# Patient Record
Sex: MALE | Race: WHITE | NOT HISPANIC OR LATINO | Employment: OTHER | URBAN - NONMETROPOLITAN AREA
[De-identification: names, ages, dates, MRNs, and addresses within clinical notes are randomized per-mention and may not be internally consistent; named-entity substitution may affect disease eponyms.]

---

## 2017-07-26 ENCOUNTER — HOSPITAL ENCOUNTER (EMERGENCY)
Facility: HOSPITAL | Age: 37
Discharge: HOME | End: 2017-07-26
Attending: EMERGENCY MEDICINE | Admitting: EMERGENCY MEDICINE

## 2017-07-26 VITALS
TEMPERATURE: 97.8 F | BODY MASS INDEX: 32.58 KG/M2 | DIASTOLIC BLOOD PRESSURE: 95 MMHG | OXYGEN SATURATION: 98 % | RESPIRATION RATE: 18 BRPM | HEIGHT: 69 IN | SYSTOLIC BLOOD PRESSURE: 145 MMHG | HEART RATE: 78 BPM | WEIGHT: 220 LBS

## 2017-07-26 DIAGNOSIS — B86 SCABIES: ICD-10-CM
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
## 2017-07-26 PROCEDURE — 99282 EMERGENCY DEPT VISIT SF MDM: CPT

## 2017-07-26 RX ORDER — PERMETHRIN 50 MG/G
CREAM TOPICAL ONCE
Qty: 1 EACH | Refills: 0 | Status: SHIPPED | OUTPATIENT
Start: 2017-07-26 | End: 2017-07-26

## 2017-07-27 NOTE — ED PROVIDER NOTES
Subjective   Patient is a 37 y.o. male presenting with rash.   History provided by:  Patient   used: No    Rash   Location:  Shoulder/arm  Shoulder/arm rash location:  R arm and L arm  Quality: painful    Pain details:     Quality:  Sore    Duration:  1 week    Timing:  Constant    Progression:  Worsening  Severity:  Mild  Duration:  1 week  Timing:  Constant  Context: insect bite/sting    Context: not animal contact, not chemical exposure, not eggs, not exposure to similar rash, not food, not hot tub use, not medications, not new detergent/soap and not sick contacts    Relieved by:  Nothing  Worsened by:  Nothing  Ineffective treatments:  None tried  Associated symptoms: no abdominal pain, no diarrhea, no fatigue, no fever, no myalgias, no nausea, no shortness of breath, no sore throat, no URI, not vomiting and not wheezing        Review of Systems   Constitutional: Negative for chills, fatigue and fever.   HENT: Negative for congestion, rhinorrhea, sore throat and trouble swallowing.    Eyes: Negative for discharge and visual disturbance.   Respiratory: Negative for cough, chest tightness, shortness of breath and wheezing.    Cardiovascular: Negative for chest pain, palpitations and leg swelling.   Gastrointestinal: Negative for abdominal pain, constipation, diarrhea, nausea and vomiting.   Genitourinary: Negative for dysuria, flank pain and hematuria.   Musculoskeletal: Negative for back pain, myalgias and neck pain.   Skin: Positive for rash. Negative for color change.   Neurological: Negative for dizziness, weakness and numbness.   Psychiatric/Behavioral: Negative for self-injury and suicidal ideas.       Past Medical History:   Diagnosis Date   • Allergic rhinitis    • Arthritis    • Back pain    • Low back pain        No Known Allergies    Past Surgical History:   Procedure Laterality Date   • CHOLECYSTECTOMY  2014   • CHOLECYSTECTOMY     • TONSILLECTOMY         Family History   Problem  Relation Age of Onset   • Diabetes Mother        Social History     Social History   • Marital status:      Spouse name: N/A   • Number of children: N/A   • Years of education: N/A     Social History Main Topics   • Smoking status: Current Every Day Smoker     Packs/day: 1.00   • Smokeless tobacco: Never Used   • Alcohol use No   • Drug use: No   • Sexual activity: Defer     Other Topics Concern   • Not on file     Social History Narrative           Objective   Physical Exam   Constitutional: He is oriented to person, place, and time. He appears well-developed and well-nourished.   HENT:   Head: Normocephalic and atraumatic.   Nose: Nose normal.   Mouth/Throat: Oropharynx is clear and moist.   Eyes: Conjunctivae and EOM are normal. Pupils are equal, round, and reactive to light.   Neck: Normal range of motion. Neck supple.   Cardiovascular: Normal rate, regular rhythm, normal heart sounds and intact distal pulses.    Pulmonary/Chest: Effort normal and breath sounds normal. No respiratory distress. He has no wheezes. He exhibits no tenderness.   Abdominal: Soft. Bowel sounds are normal. There is no tenderness. There is no rebound and no guarding.   Musculoskeletal: Normal range of motion. He exhibits no edema, tenderness or deformity.   Neurological: He is alert and oriented to person, place, and time. No cranial nerve deficit. Coordination normal.   Skin: Skin is warm and dry. Rash noted. No erythema. No pallor.   Bug bites to forearms. Bite marks between fingers. Possible scabies.    Psychiatric: He has a normal mood and affect. His behavior is normal. Judgment and thought content normal.   Nursing note and vitals reviewed.      Procedures         ED Course  ED Course                  MDM  Number of Diagnoses or Management Options  Bug bite, initial encounter:   Scabies:   Diagnosis management comments: 1 week of bug bite marks to forearms and hands. Patient unsure what is causing the bites. Painful with  intermittent puritis. No other contacts have the bites. Patient denies new clothing, bedding, detergent, soap. Has not stayed in a hotel or in someone else's house. Exam shows bug bites to forearms and hands. Marks between fingers. Possible scabies vs bed bugs. Will give rx for permethrin and mupiricin. Patient told to take medication as directed, to follow up with PCP this week, and to return for worsening symptoms. Patient is agreeable with the plan of care.       Final diagnoses:   Bug bite, initial encounter            Miranda Rios MD  07/26/17 8537

## 2018-01-15 ENCOUNTER — OFFICE VISIT (OUTPATIENT)
Dept: RETAIL CLINIC | Facility: CLINIC | Age: 38
End: 2018-01-15

## 2018-01-15 VITALS
OXYGEN SATURATION: 98 % | TEMPERATURE: 98.1 F | RESPIRATION RATE: 16 BRPM | WEIGHT: 222 LBS | HEART RATE: 98 BPM | BODY MASS INDEX: 32.88 KG/M2 | HEIGHT: 69 IN

## 2018-01-15 DIAGNOSIS — H66.90 EAR INFECTION: Primary | ICD-10-CM

## 2018-01-15 DIAGNOSIS — F17.200 CURRENT SMOKER: ICD-10-CM

## 2018-01-15 PROCEDURE — 99213 OFFICE O/P EST LOW 20 MIN: CPT | Performed by: NURSE PRACTITIONER

## 2018-01-15 RX ORDER — OFLOXACIN 3 MG/ML
10 SOLUTION AURICULAR (OTIC) DAILY
Qty: 5 ML | Refills: 0 | Status: SHIPPED | OUTPATIENT
Start: 2018-01-15 | End: 2018-01-22

## 2018-01-15 RX ORDER — AMOXICILLIN AND CLAVULANATE POTASSIUM 875; 125 MG/1; MG/1
1 TABLET, FILM COATED ORAL
Qty: 20 TABLET | Refills: 0 | Status: SHIPPED | OUTPATIENT
Start: 2018-01-15 | End: 2018-01-25

## 2018-01-15 NOTE — PATIENT INSTRUCTIONS
Steps to Quit Smoking  Smoking tobacco can be bad for your health. It can also affect almost every organ in your body. Smoking puts you and people around you at risk for many serious long-lasting (chronic) diseases. Quitting smoking is hard, but it is one of the best things that you can do for your health. It is never too late to quit.  What are the benefits of quitting smoking?  When you quit smoking, you lower your risk for getting serious diseases and conditions. They can include:  · Lung cancer or lung disease.  · Heart disease.  · Stroke.  · Heart attack.  · Not being able to have children (infertility).  · Weak bones (osteoporosis) and broken bones (fractures).  If you have coughing, wheezing, and shortness of breath, those symptoms may get better when you quit. You may also get sick less often. If you are pregnant, quitting smoking can help to lower your chances of having a baby of low birth weight.  What can I do to help me quit smoking?  Talk with your doctor about what can help you quit smoking. Some things you can do (strategies) include:  · Quitting smoking totally, instead of slowly cutting back how much you smoke over a period of time.  · Going to in-person counseling. You are more likely to quit if you go to many counseling sessions.  · Using resources and support systems, such as:  ¨ Online chats with a counselor.  ¨ Phone quitlines.  ¨ Printed self-help materials.  ¨ Support groups or group counseling.  ¨ Text messaging programs.  ¨ Mobile phone apps or applications.  · Taking medicines. Some of these medicines may have nicotine in them. If you are pregnant or breastfeeding, do not take any medicines to quit smoking unless your doctor says it is okay. Talk with your doctor about counseling or other things that can help you.  Talk with your doctor about using more than one strategy at the same time, such as taking medicines while you are also going to in-person counseling. This can help make quitting  easier.  What things can I do to make it easier to quit?  Quitting smoking might feel very hard at first, but there is a lot that you can do to make it easier. Take these steps:  · Talk to your family and friends. Ask them to support and encourage you.  · Call phone quitlines, reach out to support groups, or work with a counselor.  · Ask people who smoke to not smoke around you.  · Avoid places that make you want (trigger) to smoke, such as:  ¨ Bars.  ¨ Parties.  ¨ Smoke-break areas at work.  · Spend time with people who do not smoke.  · Lower the stress in your life. Stress can make you want to smoke. Try these things to help your stress:  ¨ Getting regular exercise.  ¨ Deep-breathing exercises.  ¨ Yoga.  ¨ Meditating.  ¨ Doing a body scan. To do this, close your eyes, focus on one area of your body at a time from head to toe, and notice which parts of your body are tense. Try to relax the muscles in those areas.  · Download or buy apps on your mobile phone or tablet that can help you stick to your quit plan. There are many free apps, such as QuitGuide from the CDC (Centers for Disease Control and Prevention). You can find more support from smokefree.gov and other websites.  This information is not intended to replace advice given to you by your health care provider. Make sure you discuss any questions you have with your health care provider.  Document Released: 10/14/2010 Document Revised: 08/15/2017 Document Reviewed: 05/03/2016  [employer] Interactive Patient Education © 2017 [employer]    Otitis Media, Adult  Otitis media is redness, soreness, and inflammation of the middle ear. Otitis media may be caused by allergies or, most commonly, by infection. Often it occurs as a complication of the common cold.  What are the signs or symptoms?  Symptoms of otitis media may include:  · Earache.  · Fever.  · Ringing in your ear.  · Headache.  · Leakage of fluid from the ear.  How is this diagnosed?  To diagnose otitis  media, your health care provider will examine your ear with an otoscope. This is an instrument that allows your health care provider to see into your ear in order to examine your eardrum. Your health care provider also will ask you questions about your symptoms.  How is this treated?  Typically, otitis media resolves on its own within 3-5 days. Your health care provider may prescribe medicine to ease your symptoms of pain. If otitis media does not resolve within 5 days or is recurrent, your health care provider may prescribe antibiotic medicines if he or she suspects that a bacterial infection is the cause.  Follow these instructions at home:  · If you were prescribed an antibiotic medicine, finish it all even if you start to feel better.  · Take medicines only as directed by your health care provider.  · Keep all follow-up visits as directed by your health care provider.  Contact a health care provider if:  · You have otitis media only in one ear, or bleeding from your nose, or both.  · You notice a lump on your neck.  · You are not getting better in 3-5 days.  · You feel worse instead of better.  Get help right away if:  · You have pain that is not controlled with medicine.  · You have swelling, redness, or pain around your ear or stiffness in your neck.  · You notice that part of your face is paralyzed.  · You notice that the bone behind your ear (mastoid) is tender when you touch it.  This information is not intended to replace advice given to you by your health care provider. Make sure you discuss any questions you have with your health care provider.  Document Released: 09/22/2005 Document Revised: 05/25/2017 Document Reviewed: 07/15/2014  [employer] Interactive Patient Education © 2017 [employer]

## 2018-01-15 NOTE — PROGRESS NOTES
"  HPI Comments: Ajay presents to the clinic today c/o upper respiratory symptoms which started appx one week ago. Associated symptoms include nasal congestion/drainage, nonproductive cough and ear pain. He has tried continuing his routine allergy medications without relief. His symptoms are worsening. Refer to ROS for additional information.    URI    This is a new problem. The current episode started in the past 7 days. The problem has been gradually worsening. Maximum temperature: Unmeasured. Associated symptoms include congestion, coughing, ear pain, headaches, a plugged ear sensation and rhinorrhea. Pertinent negatives include no nausea, rash, sneezing, sore throat, swollen glands, vomiting or wheezing. Treatments tried: Routine allergy medication. The treatment provided no relief.      Vitals:    01/15/18 1238   Pulse: 98   Resp: 16   Temp: 98.1 °F (36.7 °C)   TempSrc: Oral   SpO2: 98%   Weight: 101 kg (222 lb)   Height: 175.3 cm (69\")      The following portions of the patient's history were reviewed and updated as appropriate: allergies, current medications, past family history, past medical history, past social history, past surgical history and problem list.    Review of Systems   Constitutional: Negative for appetite change, chills and fatigue.   HENT: Positive for congestion, ear pain, hearing loss, postnasal drip, rhinorrhea and sinus pressure. Negative for ear discharge, sneezing, sore throat and trouble swallowing.    Eyes: Negative for discharge and redness.   Respiratory: Positive for cough. Negative for chest tightness, shortness of breath and wheezing.    Gastrointestinal: Negative for nausea and vomiting.   Skin: Negative for color change and rash.   Neurological: Positive for headaches. Negative for dizziness and light-headedness.   Hematological: Negative for adenopathy.   Psychiatric/Behavioral: Positive for sleep disturbance.   All other systems reviewed and are negative.    Physical Exam "   Constitutional: He is oriented to person, place, and time. He appears well-developed and well-nourished. No distress.   HENT:   Head: Normocephalic.   Right Ear: Tympanic membrane and ear canal normal. No swelling. No mastoid tenderness. Tympanic membrane is not erythematous and not bulging. No decreased hearing is noted.   Left Ear: There is swelling. No drainage. There is mastoid tenderness. Tympanic membrane is erythematous and bulging. Decreased hearing is noted.   Nose: Mucosal edema and rhinorrhea present. Right sinus exhibits no maxillary sinus tenderness and no frontal sinus tenderness. Left sinus exhibits no maxillary sinus tenderness and no frontal sinus tenderness.   Mouth/Throat: Mucous membranes are normal. Oropharyngeal exudate (PND) and posterior oropharyngeal erythema present.   Eyes: Conjunctivae are normal. Pupils are equal, round, and reactive to light. Right eye exhibits no discharge. Left eye exhibits no discharge. No scleral icterus.   Neck: Normal range of motion. Neck supple. No tracheal tenderness present.   Cardiovascular: Normal rate, regular rhythm and normal heart sounds.  Exam reveals no friction rub.    No murmur heard.  Pulmonary/Chest: Effort normal and breath sounds normal. No respiratory distress. He has no wheezes. He has no rales.   Musculoskeletal: He exhibits no edema.   Lymphadenopathy:        Head (right side): No preauricular and no posterior auricular adenopathy present.        Head (left side): No preauricular and no posterior auricular adenopathy present.     He has no cervical adenopathy.   Neurological: He is alert and oriented to person, place, and time.   Skin: Skin is warm and dry. No rash noted. No erythema.   Psychiatric: He has a normal mood and affect. His behavior is normal. Judgment and thought content normal.   Vitals reviewed.    Assessment/Plan   Problems Addressed this Visit        Other    Current smoker      Other Visit Diagnoses     Ear infection    -   Primary    Findings and recommendations discussed with Ajay. Treatment options reviewed. Counseled regarding supportive care measures.    Relevant Medications    amoxicillin-clavulanate (AUGMENTIN) 875-125 MG per tablet    ofloxacin (FLOXIN OTIC) 0.3 % otic solution        Findings and recommendations discussed with Ajay. Treatment options reviewed. Counseled regarding supportive care measures.Encouraged regarding smoking cessation and provided information on strategies to quit. Encouraged Ajay to seek further medical evaluation if symptoms worsen or do not improve within 48-72 hours.

## 2021-06-28 ENCOUNTER — OFFICE VISIT (OUTPATIENT)
Dept: PSYCHIATRY | Facility: CLINIC | Age: 41
End: 2021-06-28

## 2021-06-28 VITALS — BODY MASS INDEX: 33.03 KG/M2 | HEIGHT: 69 IN | WEIGHT: 223 LBS

## 2021-06-28 DIAGNOSIS — G47.9 SLEEP DIFFICULTIES: ICD-10-CM

## 2021-06-28 DIAGNOSIS — F41.1 GENERALIZED ANXIETY DISORDER WITH PANIC ATTACKS: Primary | ICD-10-CM

## 2021-06-28 DIAGNOSIS — R46.89 AGGRESSIVE BEHAVIOR: ICD-10-CM

## 2021-06-28 DIAGNOSIS — F39 MOOD DISORDER (HCC): ICD-10-CM

## 2021-06-28 DIAGNOSIS — F17.200 CURRENT SMOKER: ICD-10-CM

## 2021-06-28 DIAGNOSIS — G89.29 OTHER CHRONIC PAIN: ICD-10-CM

## 2021-06-28 DIAGNOSIS — F41.0 GENERALIZED ANXIETY DISORDER WITH PANIC ATTACKS: Primary | ICD-10-CM

## 2021-06-28 DIAGNOSIS — Z69.011: ICD-10-CM

## 2021-06-28 PROBLEM — Z88.9 HISTORY OF MULTIPLE ALLERGIES: Status: ACTIVE | Noted: 2017-05-19

## 2021-06-28 PROBLEM — J30.9 ALLERGIC RHINITIS: Status: ACTIVE | Noted: 2017-06-01

## 2021-06-28 PROBLEM — I10 HYPERTENSIVE DISORDER: Status: ACTIVE | Noted: 2017-05-19

## 2021-06-28 PROCEDURE — 90837 PSYTX W PT 60 MINUTES: CPT | Performed by: COUNSELOR

## 2021-06-28 PROCEDURE — 99354 PR PROLONGED SVC OUTPATIENT SETTING 1ST HOUR: CPT | Performed by: COUNSELOR

## 2021-06-28 RX ORDER — MONTELUKAST SODIUM 10 MG/1
1 TABLET ORAL
COMMUNITY
Start: 2019-01-18 | End: 2021-11-08 | Stop reason: SDUPTHER

## 2021-06-28 RX ORDER — METHOCARBAMOL 750 MG/1
1 TABLET, FILM COATED ORAL EVERY 12 HOURS
COMMUNITY
Start: 2019-01-18

## 2021-06-28 RX ORDER — HYDROCODONE BITARTRATE AND ACETAMINOPHEN 10; 325 MG/1; MG/1
TABLET ORAL
COMMUNITY
Start: 2021-06-25 | End: 2021-11-08 | Stop reason: SDUPTHER

## 2021-06-28 RX ORDER — NAPROXEN 500 MG/1
TABLET ORAL
COMMUNITY
Start: 2021-06-07 | End: 2021-11-08 | Stop reason: SDUPTHER

## 2021-06-28 RX ORDER — GABAPENTIN 800 MG/1
1 TABLET ORAL EVERY 8 HOURS
COMMUNITY
Start: 2019-02-15

## 2021-06-28 RX ORDER — CETIRIZINE HYDROCHLORIDE 10 MG/1
1 TABLET ORAL
COMMUNITY
Start: 2019-01-18 | End: 2021-11-08 | Stop reason: SDUPTHER

## 2021-06-28 RX ORDER — CETIRIZINE HYDROCHLORIDE 10 MG/1
10 TABLET ORAL DAILY
COMMUNITY
End: 2021-06-28 | Stop reason: SDUPTHER

## 2021-06-28 NOTE — TREATMENT PLAN
Multi-Disciplinary Problems (from Behavioral Health Treatment Plan)    Active Problems     Problem: Anger  Start Date: 06/28/21    Problem Details: The patient self-scales this problem as a 8 with 10 being the worst.        Goal Priority Start Date Expected End Date End Date    Patient will develop specific, socially acceptable way to manage anger. -- 06/28/21 -- --    Goal Details: Progress toward goal:  Not appropriate to rate progress toward goal since this is the initial treatment plan.        Goal Intervention Frequency Start Date End Date    Process patient's angry feelings or outbursts that have recently occurred and review alternative behaviors Q Month 06/28/21 --    Intervention Details: Duration of treatment until until remission of symptoms.        Goal Intervention Frequency Start Date End Date    Work with patient to develop constructive way to handle anger. Q Month 06/28/21 --    Intervention Details: Duration of treatment until until remission of symptoms.                           I have discussed and reviewed this treatment plan with the patient.  It has been printed for signatures.
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
## 2021-06-28 NOTE — PLAN OF CARE
Problem: Anger  Goal: Patient will develop specific, socially acceptable way to manage anger.  Outcome: Ongoing, Progressing
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
## 2021-07-27 ENCOUNTER — OFFICE VISIT (OUTPATIENT)
Dept: PSYCHIATRY | Facility: CLINIC | Age: 41
End: 2021-07-27

## 2021-07-27 DIAGNOSIS — F17.200 CURRENT SMOKER: ICD-10-CM

## 2021-07-27 DIAGNOSIS — F41.0 GENERALIZED ANXIETY DISORDER WITH PANIC ATTACKS: Primary | ICD-10-CM

## 2021-07-27 DIAGNOSIS — F32.1 CURRENT MODERATE EPISODE OF MAJOR DEPRESSIVE DISORDER, UNSPECIFIED WHETHER RECURRENT (HCC): ICD-10-CM

## 2021-07-27 DIAGNOSIS — G47.9 SLEEP DIFFICULTIES: ICD-10-CM

## 2021-07-27 DIAGNOSIS — G89.29 OTHER CHRONIC PAIN: ICD-10-CM

## 2021-07-27 DIAGNOSIS — R46.89 AGGRESSIVE BEHAVIOR: ICD-10-CM

## 2021-07-27 DIAGNOSIS — F43.9 TRAUMA AND STRESSOR-RELATED DISORDER: ICD-10-CM

## 2021-07-27 DIAGNOSIS — F41.1 GENERALIZED ANXIETY DISORDER WITH PANIC ATTACKS: Primary | ICD-10-CM

## 2021-07-27 DIAGNOSIS — F39 MOOD DISORDER (HCC): ICD-10-CM
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
## 2021-07-27 PROCEDURE — 90791 PSYCH DIAGNOSTIC EVALUATION: CPT | Performed by: COUNSELOR

## 2021-07-27 NOTE — PROGRESS NOTES
Severity Measure for Panic Disorder--Adult    Ajay Moore 1980 07/27/21    Instructions: The following questions ask about thoughts, feelings, and behaviors about panic attacks. A panic attack is an episode of intense fear that sometimes comes out of the blue (for no apparent reason). The symptoms of a panic attack include: a racing heart, shortness of breath, dizziness, sweating, and fear of losing control or dying. Please respond to each item by marking (? or x) one box per row.     Clinician  Use    During the PAST 7 DAYS, I have… Never Occasionally Half of  the time Most of  the time All of the  time Item  score     1. felt moments of sudden terror, fear or fright, sometimes out of the blue (i.e., a  panic attack)   []  0   [x]  1   []2   []3   []  4   1   2. felt anxious, worried, or nervous about  having more panic attacks   []  0   [x]  1   []2   []3   []  4   1     3. had thoughts of losing control, dying, going crazy, or other bad things happening  because of panic attacks   []  0   [x]  1   []2   []3   []  4   1       4. felt a racing heart, sweaty, trouble  breathing, faint, or shaky   []  0   [x]  1   []2   []3   []  4    5. felt tense muscles, felt on edge or restless,  or had trouble relaxing or trouble sleeping   []  0   [x]  1   []2   []3   []  4   1     6. avoided, or did not approach or enter, situations in which panic attacks might  occur   []  0   [x]  1   []2   []3   []  4    7. left situations early, or participated only  minimally, because of panic attacks   []  0   [x]  1   []2   []3   []  4   1     8. spent a lot of time preparing for, or procrastinating about (putting off), situations in which panic attacks might  occur   []  0   [x]  1   []2   []3   []  4     1       9. distracted myself to avoid thinking about  panic attacks   []  0   [x]  1   []2   []3   []  4      10. needed help to cope with panic attacks (e.g., alcohol or medication, superstitious  objects, other people)    []  0   [x]  1   []2   []3   []  4   1   Total/Partial Raw Score: 10   Prorated Total Raw Score: (if 1-2 items left unanswered)    Average Total Score: 1   Silvestre FLORES, Julius U, Mauricio S, Mira M, Abdulkadir G, Rolando ARMSTRONG. Copyright © 2013 [employer]. All rights reserved. This material can be reproduced without permission by researchers and by clinicians for use with their patients.      This document signed by Tisha Kathleen [facility] July 27, 2021 17:22 EDT

## 2021-07-27 NOTE — PROGRESS NOTES
"Personality Inventory for DSM-5 Faceted Brief Form (PID-5-BF)-Adult      Name/ID: Delmer Moore   Age:  41 Sex:   Male  Date: 07/27/21   Instructions to the individual receiving care: This is a list of things different people might say about themselves.  We are interested in how you would describe yourself.  There are no right or wrong answers.  So you can describe yourself as honestly as possible, we will keep your responses confidential.  We'd like you to take your time and read each statement carefully, selecting the response that best describes you. Clinician Use   1 People would describe me as reckless. 0 0 2 0 2   2 I feel like I act totally on impulse. 0 1 0 0 1   3 Even though I know better, I can't stop making rash decisions. 0 1 0 0 1   4 I often feel like nothing I do really matters. 0 0 2 0 2   5 Others see me as irresponsible 0 0 0 0 0   6 I'm not good at planning ahead 0 0 2 0 2   7 My thoughts often dn't make sense to others. 0 0 2 0 2   8 I worry about almost everything. 0 1 0 0 1   9 I get emotional easily, often for very little reason. 0 1 0 0 1   10 I fear being alone in life more than anything else. 0 0 0 0 0   11 I get stuck on one way of doing things, even when it's clear it won't work. 0 1 0 0 1   12 I have seen things that weren’t really there. 0 0 0 0 0   13 I steer clear of romantic relationships. 0 1 0 0 1   14 I'm not interested in making friends. 0 1 0 0 1   15 I get irritated easily by all sorts of things. 0 1 0 0 1   16 I don't like to get too close to people. 0 1 0 0 1   17 It's no big deal if I hurt other peoples' feelings. 0 1 0 0 1   18 I rarely get enthusiastic about anything. 0 0 2 0 2   19 I crave attention. 0 0 0 0 3   20 I often have to deal with people who are less important than me. 0 1 0 0 1   21 I often have thoughts that make sense to me but that other people say are strange. 0 1 0 0 1   22 I use people to get what I want. 0 0 0 0 0   23 I often \"zone out\" and then suddenly " "come to and realize that a lot of time has passed. 0 1 0 0 1   24 Things around me often feel unreal, or more real than usual. 0 1 0 0 1   25 It is easy for me to take advantage of others. 0 1 0 0 1                Personality Trait Facet and Domain Scoring: The Personality Inventory for DSM-5 Faceted Brief Form (PID-5-FBF)- Adult     Step 1: Compute the Personality Trait Facet Scores using the Facet Table below.         Step 2: Compute the Personality Trait Domain Scores using the Domain Score Table below.       A. Personality Trait Domain B. PID-BF Items C.  Total/Partial Raw Domain Score D.  Prorated Domain Score E.  Average Domain Score     Negative Affect 8, 9, 10, 11, 15 4   1      Detachment 4, 13, 14, 16, 18 7   1      Antagonism 17, 19, 20, 22, 25 5   1      Disinhibition 1, 2, 3, 5, 6 6   1      Psychoticism 7, 12, 21, 23, 24 5   1                 Scoring Interpretation:  This individual's overall Facet Scores indicates his thoughts, emotions, and behaviors are mildly influenced by:  Detachment , Negative Affect, Antagonism, Psychoticsm.                *Negative affect is regularly recognized as a \"stable, heritable trait tendency to experience a broad range of negative feelings, such as worry, anxiety, self-criticisms, and a negative self-view\".   * Detachment is when individuals may be emotionally anhedonic or depressed, and may generally tend to avoid and withdraw from others, of whom they may be suspicious.   beliefs involving a lack of interest in relationships (schizoid), mistrust (paranoid), independence (autonomy), and interpersonal ambivalence (dependent/avoidant) had the strongest associations with Detachment. Detached individuals may also tend to harbor self-aggrandizing beliefs (narcissism), which may further signify interpersonal disruption.   *Antagonism (like low agreeableness) involves callous or antisocial traits as well as grandiosity and attention-seeking. Disnihibtion (like low " conscientiousness) involves irresponsible, impulsive, and risk taking behaviors, such as distractibility and carelessness.    *Disinhibition is a lack of restraint manifested in disregard of social conventions, impulsivity, and poor risk assessment. Disinhibition affects motor, instinctual, emotional, cognitive, and perceptual aspects with signs and symptoms similar to the diagnostic criteria for charly. Hypersexuality, hyperphagia, and aggressive outbursts are indicative of disinhibited instinctual drives.   * Psychoticism is defined by Eysenck as a personality type that is prone to take risks, might engage in anti-social behaviors, impulsiveness, or non-conformist behavior. Extraversion includes outgoing or very social behavior. Think of someone who is always the life of the party. This person is probably an extrovert.   Clinical Use Only           Conclusion:  Due to this individual's current level of functioning and historical factors, it is highly recommended he continue with outpatient treatment.     Recommended Treatment: MI, CBT/REBT, EMDR, DBT and Trauma-Informed therapies        Signature:  _______________________________________________ Date: ___________________________
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
## 2021-07-27 NOTE — PATIENT INSTRUCTIONS
Major Depressive Disorder, Adult  Major depressive disorder is a mental health condition. This disorder affects feelings. It can also affect the body. Symptoms of this condition last most of the day, almost every day, for 2 weeks. This disorder can affect:  · Relationships.  · Daily activities, such as work and school.  · Activities that you normally like to do.  What are the causes?  The cause of this condition is not known. The disorder is likely caused by a mix of things, including:  · Your personality, such as being a shy person.  · Your behavior, or how you act toward others.  · Your thoughts and feelings.  · Too much alcohol or drugs.  · How you react to stress.  · Health and mental problems that you have had for a long time.  · Things that hurt you in the past (trauma).  · Big changes in your life, such as divorce.  What increases the risk?  The following factors may make you more likely to develop this condition:  · Having family members with depression.  · Being a woman.  · Problems in the family.  · Low levels of some brain chemicals.  · Things that caused you pain as a child, especially if you lost a parent or were abused.  · A lot of stress in your life, such as from:  ? Living without basic needs of life, such as food and shelter.  ? Being treated poorly because of race, sex, or Christianity (discrimination).  · Health and mental problems that you have had for a long time.  What are the signs or symptoms?  The main symptoms of this condition are:  · Being sad all the time.  · Being grouchy all the time.  · Loss of interest in things and activities.  Other symptoms include:  · Sleeping too much or too little.  · Eating too much or too little.  · Gaining or losing weight, without knowing why.  · Feeling tired or having low energy.  · Being restless and weak.  · Feeling hopeless, worthless, or guilty.  · Trouble thinking clearly or making decisions.  · Thoughts of hurting yourself or others, or thoughts of  ending your life.  · Spending a lot of time alone.  · Inability to complete common tasks of daily life.  If you have very bad MDD, you may:  · Believe things that are not true.  · Hear, see, taste, or feel things that are not there.  · Have mild depression that lasts for at least 2 years.  · Feel very sad and hopeless.  · Have trouble speaking or moving.  How is this treated?  This condition may be treated with:  · Talk therapy. This teaches you to know bad thoughts, feelings, and actions and how to change them.  ? This can also help you to communicate with others.  ? This can be done with members of your family.  · Medicines. These can be used to treat worry (anxiety), depression, or low levels of chemicals in the brain.  · Lifestyle changes. You may need to:  ? Limit alcohol use.  ? Limit drug use.  ? Get regular exercise.  ? Get plenty of sleep.  ? Make healthy eating choices.  ? Spend more time outdoors.  · Brain stimulation. This treatment excites the brain. This is done when symptoms are very bad or have not gotten better with other treatments.  Follow these instructions at home:  Activity  · Get regular exercise as told.  · Spend time outdoors as told.  · Make time to do the things you enjoy.  · Find ways to deal with stress. Try to:  ? Meditate.  ? Do deep breathing.  ? Spend time in nature.  ? Keep a journal.  · Return to your normal activities as told by your doctor. Ask your doctor what activities are safe for you.  Alcohol and drug use  · If you drink alcohol:  ? Limit how much you use to:  § 0-1 drink a day for women.  § 0-2 drinks a day for men.  ? Be aware of how much alcohol is in your drink. In the [place] one drink equals one 12 oz bottle of beer (355 mL), one 5 oz glass of wine (148 mL), or one 1½ oz glass of hard liquor (44 mL).  · Talk to your doctor about:  ? Alcohol use. Alcohol can affect some medicines.  ? Any drug use.  General instructions    · Take over-the-counter and prescription  medicines and herbal preparations only as told by your doctor.  · Eat a healthy diet.  · Get a lot of sleep.  · Think about joining a support group. Your doctor may be able to suggest one.  · Keep all follow-up visits as told by your doctor. This is important.  Where to find more information:  · National [place] on Mental Illness: www.elton.org  · [place] National [place] of Mental Health: www.nimh.nih.gov  · American Psychiatric Association: www.psychiatry.org/patients-families/  Contact a doctor if:  · Your symptoms get worse.  · You get new symptoms.  Get help right away if:  · You hurt yourself.  · You have serious thoughts about hurting yourself or others.  · You see, hear, taste, smell, or feel things that are not there.  If you ever feel like you may hurt yourself or others, or have thoughts about taking your own life, get help right away. Go to your nearest emergency department or:  · Call your local emergency services (911 in the [place]  · Call a suicide crisis helpline, such as the National Suicide Prevention Lifeline at 1-283.626.2427. This is open 24 hours a day in the [place]  · Text the Crisis Text Line at 347199 (in the [place]  Summary  · Major depressive disorder is a mental health condition. This disorder affects feelings. Symptoms of this condition last most of the day, almost every day, for 2 weeks.  · The symptoms of this disorder can cause problems with relationships and with daily activities.  · There are treatments and support for people who get this disorder. You may need more than one type of treatment.  · Get help right away if you have serious thoughts about hurting yourself or others.  This information is not intended to replace advice given to you by your health care provider. Make sure you discuss any questions you have with your health care provider.  Document Revised: 11/28/2020 Document Reviewed: 11/28/2020  Elsevier Patient Education © 2021 [employer]  Managing Post-Traumatic Stress  Disorder  If you have been diagnosed with post-traumatic stress disorder (PTSD), you may be relieved that you now know why you have felt or behaved a certain way. Still, you may feel overwhelmed about the treatment ahead. You may also wonder how to get the support you need and how to deal with the condition day-to-day.  If you are living with PTSD, there are ways to help you recover from it and manage your symptoms.  How to manage lifestyle changes  Managing stress  Stress is your body's reaction to life changes and events, both good and bad. Stress can make PTSD worse. Take the following steps to manage stress:  · Talk with your health care provider or a counselor if you would like to learn more about techniques to reduce your stress. He or she may suggest some stress reduction techniques such as:  ? Muscle relaxation exercises.  ? Regular exercise.  ? Meditation, yoga, or other mind-body exercises.  ? Breathing exercises.  ? Listening to quiet music.  ? Spending time outside.  · Maintain a healthy lifestyle. Eat a healthy diet, exercise regularly, get plenty of sleep, and take time to relax.  · Spend time with others. Talk with them about how you are feeling and what kind of support you need. Try not to isolate yourself, even though you may feel like doing that. Isolating yourself can delay your recovery.  · Do activities and hobbies that you enjoy.  · Pace yourself when doing stressful things. Take breaks, and reward yourself when you finish. Make sure that you do not overload your schedule.    Medicines  Your health care provider may suggest certain medicines if he or she feels that they will help to improve your condition. Medicines for depression (antidepressants) or severe loss of contact with reality (antipsychotics) may be used to treat PTSD. Avoid using alcohol and other substances that may prevent your medicines from working properly. It is also important to:  · Talk with your pharmacist or health care  provider about all medicines that you take, their possible side effects, and which medicines are safe to take together.  · Make it your goal to take part in all treatment decisions (shared decision-making). Ask about possible side effects of medicines that your health care provider recommends, and tell him or her how you feel about having those side effects. It is best if shared decision-making with your health care provider is part of your total treatment plan.  If your health care provider prescribes a medicine, you may not notice the full benefits of it for 4-8 weeks. Most people who are treated for PTSD need to take medicine for at least 6-12 months after they feel better. If you are taking medicines as part of your treatment, do not stop taking medicines before you ask your health care provider if it is safe to stop. You may need to have the medicine slowly decreased (tapered) over time to lower the risk of harmful side effects.  Relationships  Many people who have PTSD have difficulty trusting others. Make an effort to:  · Take risks and develop trust with close friends and family members. Developing trust in others can help you feel safe and connect you with emotional support.  · Be open and honest about your feelings.  · Have fun and relax in safe spaces, such as with friends and family.  · Think about going to couples counseling, family education classes, or family therapy. Your loved ones may not always know how to be supportive. Therapy can be helpful for everyone.  How to recognize changes in your condition  Be aware of your symptoms and how often you have them. The following symptoms mean that you need to seek help for your PTSD:  · You feel suspicious and angry.  · You have repeated flashbacks.  · You avoid going out or being with others.  · You have an increasing number of fights with close friends or family members, such as your spouse.  · You have thoughts about hurting yourself or others.  · You  cannot get relief from feelings of depression or anxiety.  Follow these instructions at home:  Lifestyle  · Exercise regularly. Try to do 30 or more minutes of physical activity on most days of the week.  · Try to get 7-9 hours of sleep each night. To help with sleep:  ? Keep your bedroom cool and dark.  ? Avoid screen time before bedtime. This means avoiding use of your TV, computer, tablet, and cell phone.  · Practice self-soothing skills and use them daily.  · Try to have fun and seek humor in your life.  Eating and drinking  · Do not eat a heavy meal during the hour before you go to bed.  · Do not drink alcohol or caffeinated drinks before bed.  · Avoid using alcohol or drugs.  General instructions  · If your PTSD is affecting your marriage or family, seek help from a family therapist.  · Take over-the-counter and prescription medicines only as told by your health care provider.  · Make sure to let all of your health care providers know that you have PTSD. This is especially important if you are having surgery or need to be admitted to the hospital.  · Keep all follow-up visits as told by your health care providers. This is important.  Where to find support  Talking to others  · Explain that PTSD is a mental health problem. It is something that a person can develop after experiencing or seeing a life-threatening event. Tell them that PTSD makes you feel stress like you did during the event.  · Talk to your loved ones about the symptoms you have. Also tell them what things or situations can cause symptoms to start (are triggers for you).  · Assure your loved ones that there are treatments to help PTSD. Discuss possibly seeking family therapy or couples therapy.  · If you are worried or fearful about seeking treatment, ask for support.  · Keep daily contact with at least one trusted friend or family member.  Finances  Not all insurance plans cover mental health care, so it is important to check with your insurance  carrier. If paying for co-pays or counseling services is a problem, search for a local or [facility] mental health care center. Public mental health care services may be offered there at a low cost or no cost when you are not able to see a private health care provider. If you are a , contact a local veterans organization or [facility] for more information.  If you are taking medicine for PTSD, you may be able to get the genericform, which may be less expensive than brand-name medicine. Some makers of prescription medicines also offer help to patients who cannot afford the medicines that they need.  Therapy and support groups  · Find a support group in your community. Often, groups are available for  veterans, trauma victims, and family members or caregivers.  · Look into volunteer opportunities. Taking part in these can help you feel more connected to your community.  · Contact a local organization to find out if you are eligible for a service dog.  Where to find more information  Go to this website to find more information about PTSD, treatment of PTSD, and how to get support:  · National Center for PTSD: www.ptsd.va.gov  Contact a health care provider if:  · Your symptoms get worse or do not get better.  Get help right away if:  · You have thoughts about hurting yourself or others.  If you ever feel like you may hurt yourself or others, or have thoughts about taking your own life, get help right away. You can go to your nearest emergency department or call:  · Your local emergency services (911 in the [place]  · A suicide crisis helpline, such as the National Suicide Prevention Lifeline at 1-114.112.2291. This is open 24-hours a day.  Summary  · If you are living with PTSD, there are ways to help you recover from it and manage your symptoms.  · Find supportive environments and people who understand PTSD. Spend time in those places, and maintain contact with those people.  · Work with your health  care team to create a plan for managing PTSD. The plan should include counseling, stress reduction techniques, and healthy lifestyle habits.  This information is not intended to replace advice given to you by your health care provider. Make sure you discuss any questions you have with your health care provider.  Document Revised: 04/10/2020 Document Reviewed: 04/19/2018  [employer] Patient Education © 2021 [employer] Inc.

## 2021-07-29 ENCOUNTER — OFFICE VISIT (OUTPATIENT)
Dept: PSYCHIATRY | Facility: CLINIC | Age: 41
End: 2021-07-29

## 2021-07-29 VITALS
DIASTOLIC BLOOD PRESSURE: 94 MMHG | WEIGHT: 227.8 LBS | SYSTOLIC BLOOD PRESSURE: 148 MMHG | HEART RATE: 99 BPM | HEIGHT: 69 IN | BODY MASS INDEX: 33.74 KG/M2

## 2021-07-29 DIAGNOSIS — Z79.899 MEDICATION MANAGEMENT: ICD-10-CM

## 2021-07-29 DIAGNOSIS — F41.1 GENERALIZED ANXIETY DISORDER WITH PANIC ATTACKS: Primary | ICD-10-CM

## 2021-07-29 DIAGNOSIS — F43.9 TRAUMA AND STRESSOR-RELATED DISORDER: ICD-10-CM

## 2021-07-29 DIAGNOSIS — F32.1 CURRENT MODERATE EPISODE OF MAJOR DEPRESSIVE DISORDER, UNSPECIFIED WHETHER RECURRENT (HCC): ICD-10-CM

## 2021-07-29 DIAGNOSIS — F39 MOOD DISORDER (HCC): ICD-10-CM

## 2021-07-29 DIAGNOSIS — F41.0 GENERALIZED ANXIETY DISORDER WITH PANIC ATTACKS: Primary | ICD-10-CM

## 2021-07-29 PROCEDURE — 90792 PSYCH DIAG EVAL W/MED SRVCS: CPT | Performed by: NURSE PRACTITIONER

## 2021-07-29 RX ORDER — TRAZODONE HYDROCHLORIDE 50 MG/1
50 TABLET ORAL NIGHTLY
Qty: 30 TABLET | Refills: 0 | Status: SHIPPED | OUTPATIENT
Start: 2021-07-29 | End: 2021-09-22 | Stop reason: SDUPTHER

## 2021-07-29 RX ORDER — SERTRALINE HYDROCHLORIDE 25 MG/1
25 TABLET, FILM COATED ORAL DAILY
Qty: 30 TABLET | Refills: 0 | Status: SHIPPED | OUTPATIENT
Start: 2021-07-29 | End: 2021-09-22 | Stop reason: SDUPTHER

## 2021-09-13 ENCOUNTER — OFFICE VISIT (OUTPATIENT)
Dept: PSYCHIATRY | Facility: CLINIC | Age: 41
End: 2021-09-13

## 2021-09-13 ENCOUNTER — DOCUMENTATION (OUTPATIENT)
Dept: PSYCHIATRY | Facility: CLINIC | Age: 41
End: 2021-09-13

## 2021-09-13 ENCOUNTER — TREATMENT (OUTPATIENT)
Dept: PSYCHIATRY | Facility: CLINIC | Age: 41
End: 2021-09-13

## 2021-09-13 VITALS — BODY MASS INDEX: 33.13 KG/M2 | WEIGHT: 227 LBS

## 2021-09-13 DIAGNOSIS — R46.89 AGGRESSIVE BEHAVIOR: ICD-10-CM

## 2021-09-13 DIAGNOSIS — F41.0 GENERALIZED ANXIETY DISORDER WITH PANIC ATTACKS: Primary | ICD-10-CM

## 2021-09-13 DIAGNOSIS — F43.9 TRAUMA AND STRESSOR-RELATED DISORDER: ICD-10-CM

## 2021-09-13 DIAGNOSIS — F32.1 CURRENT MODERATE EPISODE OF MAJOR DEPRESSIVE DISORDER, UNSPECIFIED WHETHER RECURRENT (HCC): ICD-10-CM
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
## 2021-09-13 DIAGNOSIS — R45.4 DIFFICULTY CONTROLLING ANGER: ICD-10-CM

## 2021-09-13 DIAGNOSIS — G47.9 SLEEP DIFFICULTIES: ICD-10-CM

## 2021-09-13 DIAGNOSIS — F41.1 GENERALIZED ANXIETY DISORDER WITH PANIC ATTACKS: Primary | ICD-10-CM

## 2021-09-13 PROCEDURE — 90837 PSYTX W PT 60 MINUTES: CPT | Performed by: COUNSELOR
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
## 2021-09-13 NOTE — TREATMENT PLAN
Multi-Disciplinary Problems (from Behavioral Health Treatment Plan)    Active Problems     Problem: Anger  Start Date: 06/28/21    Problem Details: The patient self-scales this problem as a 7 with 10 being the worst.        Goal Priority Start Date Expected End Date End Date    Patient will develop specific, socially acceptable way to manage anger. -- 06/28/21 -- --    Goal Details: Progress toward goal:  Pt reports minimal progress due to limited engagement with therapist.      Goal Intervention Frequency Start Date End Date    Process patient's angry feelings or outbursts that have recently occurred and review alternative behaviors Q Month 06/28/21 --    Intervention Details: Duration of treatment until until remission of symptoms.      Goal Intervention Frequency Start Date End Date    Work with patient to develop constructive way to handle anger. Q Month 06/28/21 --    Intervention Details: Duration of treatment until until remission of symptoms.                         I have discussed and reviewed this treatment plan with the patient.  It has been printed for signatures.
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
## 2021-09-13 NOTE — PROGRESS NOTES
Scoring:  Overall, this individual scored a 39/100.  He falls in the 0 - 45 Range:     Interpretation:  The amount of anger and frustration generally experienced is remarkably low. Only a small percentage of the population will score this low on a test. You might want to examine whether you were honest with your answers and the possibility that you deny angry feelings.    Conclusion:  Due to possible bias, it is HIGHLY recommended this individual be re-evaluated using another anger assessment (e.g., DELIA-1, PROS-AM, and/or STAXI-2) to prevent test bias.        This document signed by LASHELL Gaitan-S, [facility] September 13, 2021 10:36 EDT

## 2021-09-15 NOTE — PROGRESS NOTES
Subjective   Ajay Moore is a 41 y.o. male who presents today for follow up    Chief Complaint:  Anxiety and depression    History of Present Illness:   Here for a follow-up visit.  He is taking his medication as prescribed, denies side effects.  He brought lab reports (CBC, CMP, LIPID PANEL) completed 3/2021.  Things are a little better. Depression rated 2/10, anxiety 2/10, with 10 being the worst.  Sleeping is improved, some nights are better than others.  Frequently he wakes up around 2 am. Denies NM. Appetite is good.  Denies SI/HI/AVH.  Denies thoughts of self-harm.  Chronic health issues, no acute physical or medical issues today.                     The following portions of the patient's history were reviewed and updated as appropriate: allergies, current medications, past family history, past medical history, past social history, past surgical history and problem list.      Past Medical History:  Past Medical History:   Diagnosis Date   • Allergic rhinitis    • Arthritis    • Back pain    • Disorder of emotion (Anger)    • Low back pain    • Panic disorder        Social History:  Social History     Socioeconomic History   • Marital status:      Spouse name: Not on file   • Number of children: Not on file   • Years of education: Not on file   • Highest education level: Not on file   Tobacco Use   • Smoking status: Current Every Day Smoker     Packs/day: 1.00   • Smokeless tobacco: Never Used   Vaping Use   • Vaping Use: Never used   Substance and Sexual Activity   • Alcohol use: No   • Drug use: No   • Sexual activity: Yes     Partners: Female     Birth control/protection: Surgical       Family History:  Family History   Problem Relation Age of Onset   • Diabetes Mother        Past Surgical History:  Past Surgical History:   Procedure Laterality Date   • CHOLECYSTECTOMY     • TONSILLECTOMY         Problem List:  Patient Active Problem List   Diagnosis   • Current smoker   • Allergic rhinitis   •  "History of multiple allergies   • Hypertensive disorder       Allergy:   No Known Allergies     Current Medications:   Current Outpatient Medications   Medication Sig Dispense Refill   • cetirizine (ZyrTEC Allergy) 10 MG tablet Take 1 tablet by mouth.     • gabapentin (Neurontin) 800 MG tablet Take 1 tablet by mouth Every 8 (Eight) Hours.     • HYDROcodone-acetaminophen (NORCO)  MG per tablet      • methocarbamol (ROBAXIN) 750 MG tablet Take 1 tablet by mouth Every 12 (Twelve) Hours.     • montelukast (Singulair) 10 MG tablet Take 1 tablet by mouth.     • naproxen (NAPROSYN) 500 MG tablet TAKE ONE TABLET BY MOUTH TWICE A DAY EVERY DAY WITH FOOD     • sertraline (Zoloft) 50 MG tablet Take 1 tablet by mouth Daily. 30 tablet 0   • traZODone (DESYREL) 100 MG tablet Take 1 tablet by mouth Every Night. 30 tablet 0     No current facility-administered medications for this visit.       Review of Symptoms:    Review of Systems   Constitutional: Negative.    HENT: Negative.    Eyes: Negative.    Respiratory: Negative.    Cardiovascular: Negative.    Skin: Negative.    Neurological: Negative.    Psychiatric/Behavioral: Positive for depressed mood. The patient is nervous/anxious.        Objective   Physical Exam:   Blood pressure 138/100, pulse 81, temperature 97.9 °F (36.6 °C), height 176.3 cm (69.41\"), weight 104 kg (230 lb), SpO2 98 %.  Body mass index is 33.57 kg/m².    Appearance:  male appears stated age, no acute distress noted.    Gait, Station, Strength: Steady, posture erect, WNL      Mental Status Exam:   Hygiene:   good  Cooperation:  Cooperative  Eye Contact:  Good  Psychomotor Behavior:  Appropriate  Affect:  Appropriate  Mood: normal  Hopelessness: Denies  Speech:  Normal  Thought Process:  Goal directed and Linear  Thought Content:  Normal  Suicidal:  None  Homicidal:  None  Hallucinations:  None  Delusion:  None  Memory:  Intact  Orientation:  Person, Place, Time and Situation  Reliability:  " fair  Insight:  Fair  Judgement:  Fair  Impulse Control:  Fair  Physical/Medical Issues:  Yes Joint pain, back pain     2    Lab Results:   No visits with results within 1 Month(s) from this visit.   Latest known visit with results is:   No results found for any previous visit.       Assessment/Plan   Problems Addressed this Visit     None      Visit Diagnoses     Generalized anxiety disorder with panic attacks    -  Primary    Relevant Medications    sertraline (Zoloft) 50 MG tablet    traZODone (DESYREL) 100 MG tablet    Other Relevant Orders    TSH    T4, Free    Current moderate episode of major depressive disorder, unspecified whether recurrent (CMS/HCC)        Relevant Medications    sertraline (Zoloft) 50 MG tablet    traZODone (DESYREL) 100 MG tablet    Other Relevant Orders    TSH    T4, Free    Trauma and stressor-related disorder        Relevant Medications    sertraline (Zoloft) 50 MG tablet    traZODone (DESYREL) 100 MG tablet    Other Relevant Orders    TSH    T4, Free    Sleep difficulties        Difficulty controlling anger        Medication management        Relevant Medications    sertraline (Zoloft) 50 MG tablet    traZODone (DESYREL) 100 MG tablet    Current moderate episode of major depressive disorder, unspecified whether recurrent (HCC)        Relevant Medications    sertraline (Zoloft) 50 MG tablet    traZODone (DESYREL) 100 MG tablet    Other Relevant Orders    TSH    T4, Free    Mood disorder (CMS/HCC) R/O: PTSD        Relevant Medications    sertraline (Zoloft) 50 MG tablet    traZODone (DESYREL) 100 MG tablet      Diagnoses       Codes Comments    Generalized anxiety disorder with panic attacks    -  Primary ICD-10-CM: F41.1, F41.0  ICD-9-CM: 300.02, 300.01     Current moderate episode of major depressive disorder, unspecified whether recurrent (CMS/HCC)     ICD-10-CM: F32.1  ICD-9-CM: 296.22     Trauma and stressor-related disorder     ICD-10-CM: F43.9  ICD-9-CM: 309.81, 308.9     Sleep  difficulties     ICD-10-CM: G47.9  ICD-9-CM: 780.50     Difficulty controlling anger     ICD-10-CM: R45.4  ICD-9-CM: 799.29     Medication management     ICD-10-CM: Z79.899  ICD-9-CM: V58.69     Current moderate episode of major depressive disorder, unspecified whether recurrent (HCC)     ICD-10-CM: F32.1  ICD-9-CM: 296.22     Mood disorder (CMS/HCC) R/O: PTSD     ICD-10-CM: F39  ICD-9-CM: 296.90           Social History     Tobacco Use   Smoking Status Current Every Day Smoker   • Packs/day: 1.00   Smokeless Tobacco Never Used     YOSVANY reviewed and appropriate. Patient counseled on use of controlled substances.     -The benefits of a healthy diet and exercise were discussed with patient, especially the positive effects they have on mental health. Patient encouraged to consider lifestyle modification regarding  diet and exercise patterns to maximize results of mental health treatment.  -Reviewed previous available documentation  -Reviewed most recent available labs   -I've explained to him that drugs of the SSRI class can have side effects such as weight gain, sexual dysfunction, insomnia, headache, nausea. These medications are generally effective at alleviating symptoms of anxiety and/or depression. Let me know if significant side effects do occur.    -Reviewed CBC, CMP, LIPID panel from 3/2021.      Visit Diagnoses:    ICD-10-CM ICD-9-CM   1. Generalized anxiety disorder with panic attacks  F41.1 300.02    F41.0 300.01   2. Current moderate episode of major depressive disorder, unspecified whether recurrent (CMS/HCC)  F32.1 296.22   3. Trauma and stressor-related disorder  F43.9 309.81     308.9   4. Sleep difficulties  G47.9 780.50   5. Difficulty controlling anger  R45.4 799.29   6. Medication management  Z79.899 V58.69   7. Current moderate episode of major depressive disorder, unspecified whether recurrent (HCC)  F32.1 296.22   8. Mood disorder (CMS/HCC) R/O: PTSD  F39 296.90         TREATMENT PLAN/GOALS:  Continue supportive psychotherapy efforts and medications as indicated. Treatment and medication options discussed during today's visit. Patient acknowledged and verbally consented to continue with current treatment plan and was educated on the importance of compliance with treatment and follow-up appointments.    MEDICATION ISSUES:  Discussed medication options and treatment plan of prescribed medication as well as the risks, benefits, and side effects including potential falls, possible impaired driving and metabolic adversities among others. Patient is agreeable to call the office with any worsening of symptoms or onset of side effects. Patient is agreeable to call 911 or go to the nearest ER should he/she begin having SI/HI.     MEDS ORDERED DURING VISIT:  New Medications Ordered This Visit   Medications   • sertraline (Zoloft) 50 MG tablet     Sig: Take 1 tablet by mouth Daily.     Dispense:  30 tablet     Refill:  0   • traZODone (DESYREL) 100 MG tablet     Sig: Take 1 tablet by mouth Every Night.     Dispense:  30 tablet     Refill:  0       Return in about 1 month (around 10/22/2021) for Recheck.  -Continue psychotherapy.  -Increase  Zoloft 50 mg tablet take 1 tablet by mouth daily for anxiety and depression.  -Increase trazodone 100 mg tablet take 1 tablet by mouth every night for insomnia, anxiety and depression.  -TSH, T4         Prognosis: Guarded dependent on medication/follow up and treatment plan compliance.  Functionality: pt showing improvements in important areas of daily functioning.     Short-term goals: Patient will adhere to medication regimen and note continued improvement in symptoms over the next 3 months.   Long-term goals: Patient will be adherent to medication management and psychotherapy with continued improvement in symptoms over the next 6 months      I spent 30 minutes caring for Ajay on this date of service. This time includes time spent by me in the following activities:  preparing for the visit, obtaining and/or reviewing a separately obtained history, performing a medically appropriate examination and/or evaluation, counseling and educating the patient/family/caregiver, ordering medications, tests, or procedures and documenting information in the medical record          This document has been electronically signed by Angelic Dotson, [facility]   September 22, 2021 09:29 EDT    Part of this note may be an electronic transcription/translation of spoken language to printed text using the Dragon Dictation System.

## 2021-09-22 ENCOUNTER — OFFICE VISIT (OUTPATIENT)
Dept: PSYCHIATRY | Facility: CLINIC | Age: 41
End: 2021-09-22

## 2021-09-22 ENCOUNTER — LAB (OUTPATIENT)
Dept: LAB | Facility: HOSPITAL | Age: 41
End: 2021-09-22

## 2021-09-22 VITALS
BODY MASS INDEX: 34.07 KG/M2 | OXYGEN SATURATION: 98 % | DIASTOLIC BLOOD PRESSURE: 100 MMHG | HEIGHT: 69 IN | WEIGHT: 230 LBS | HEART RATE: 81 BPM | TEMPERATURE: 97.9 F | SYSTOLIC BLOOD PRESSURE: 138 MMHG

## 2021-09-22 DIAGNOSIS — R45.4 DIFFICULTY CONTROLLING ANGER: ICD-10-CM

## 2021-09-22 DIAGNOSIS — G47.9 SLEEP DIFFICULTIES: ICD-10-CM

## 2021-09-22 DIAGNOSIS — F39 MOOD DISORDER (HCC): ICD-10-CM

## 2021-09-22 DIAGNOSIS — Z79.899 MEDICATION MANAGEMENT: ICD-10-CM

## 2021-09-22 DIAGNOSIS — F41.0 GENERALIZED ANXIETY DISORDER WITH PANIC ATTACKS: Primary | ICD-10-CM

## 2021-09-22 DIAGNOSIS — F32.1 CURRENT MODERATE EPISODE OF MAJOR DEPRESSIVE DISORDER, UNSPECIFIED WHETHER RECURRENT (HCC): ICD-10-CM

## 2021-09-22 DIAGNOSIS — F43.9 TRAUMA AND STRESSOR-RELATED DISORDER: ICD-10-CM

## 2021-09-22 DIAGNOSIS — F41.1 GENERALIZED ANXIETY DISORDER WITH PANIC ATTACKS: Primary | ICD-10-CM

## 2021-09-22 LAB
T4 FREE SERPL-MCNC: 1.28 NG/DL (ref 0.93–1.7)
TSH SERPL DL<=0.05 MIU/L-ACNC: 2.49 UIU/ML (ref 0.27–4.2)

## 2021-09-22 PROCEDURE — 84439 ASSAY OF FREE THYROXINE: CPT | Performed by: NURSE PRACTITIONER

## 2021-09-22 PROCEDURE — 99214 OFFICE O/P EST MOD 30 MIN: CPT | Performed by: NURSE PRACTITIONER

## 2021-09-22 PROCEDURE — 84443 ASSAY THYROID STIM HORMONE: CPT | Performed by: NURSE PRACTITIONER

## 2021-09-22 RX ORDER — TRAZODONE HYDROCHLORIDE 100 MG/1
100 TABLET ORAL NIGHTLY
Qty: 30 TABLET | Refills: 0 | Status: SHIPPED | OUTPATIENT
Start: 2021-09-22 | End: 2021-11-11 | Stop reason: SDUPTHER

## 2021-11-08 ENCOUNTER — OFFICE VISIT (OUTPATIENT)
Dept: PSYCHIATRY | Facility: CLINIC | Age: 41
End: 2021-11-08
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
## 2021-11-08 DIAGNOSIS — F41.1 GENERALIZED ANXIETY DISORDER WITH PANIC ATTACKS: Primary | ICD-10-CM

## 2021-11-08 DIAGNOSIS — G47.9 SLEEP DIFFICULTIES: ICD-10-CM

## 2021-11-08 DIAGNOSIS — F39 MOOD DISORDER (HCC): ICD-10-CM

## 2021-11-08 DIAGNOSIS — F32.1 CURRENT MODERATE EPISODE OF MAJOR DEPRESSIVE DISORDER, UNSPECIFIED WHETHER RECURRENT (HCC): ICD-10-CM

## 2021-11-08 DIAGNOSIS — R46.89 AGGRESSIVE BEHAVIOR: ICD-10-CM

## 2021-11-08 DIAGNOSIS — F43.9 TRAUMA AND STRESSOR-RELATED DISORDER: ICD-10-CM

## 2021-11-08 DIAGNOSIS — F41.0 GENERALIZED ANXIETY DISORDER WITH PANIC ATTACKS: Primary | ICD-10-CM

## 2021-11-08 DIAGNOSIS — R45.4 DIFFICULTY CONTROLLING ANGER: ICD-10-CM

## 2021-11-08 PROBLEM — G89.29 CHRONIC LOW BACK PAIN: Status: ACTIVE | Noted: 2021-11-08

## 2021-11-08 PROBLEM — M54.50 CHRONIC LOW BACK PAIN: Status: ACTIVE | Noted: 2021-11-08

## 2021-11-08 PROBLEM — I10 ESSENTIAL HYPERTENSION: Status: ACTIVE | Noted: 2021-11-08

## 2021-11-08 PROCEDURE — 90837 PSYTX W PT 60 MINUTES: CPT | Performed by: COUNSELOR

## 2021-11-08 RX ORDER — CETIRIZINE HYDROCHLORIDE 10 MG/1
1 TABLET ORAL DAILY
COMMUNITY
Start: 2021-10-25

## 2021-11-08 RX ORDER — HYDROCODONE BITARTRATE AND ACETAMINOPHEN 10; 325 MG/1; MG/1
TABLET ORAL
COMMUNITY
Start: 2021-10-25

## 2021-11-08 RX ORDER — NAPROXEN 500 MG/1
TABLET ORAL EVERY 12 HOURS
COMMUNITY
Start: 2021-10-25

## 2021-11-08 RX ORDER — MONTELUKAST SODIUM 10 MG/1
1 TABLET ORAL EVERY EVENING
COMMUNITY
Start: 2021-10-25
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
## 2021-11-10 ENCOUNTER — TELEPHONE (OUTPATIENT)
Dept: PSYCHIATRY | Facility: CLINIC | Age: 41
End: 2021-11-10

## 2021-11-11 ENCOUNTER — OFFICE VISIT (OUTPATIENT)
Dept: PSYCHIATRY | Facility: CLINIC | Age: 41
End: 2021-11-11

## 2021-11-11 VITALS
SYSTOLIC BLOOD PRESSURE: 158 MMHG | BODY MASS INDEX: 32.78 KG/M2 | DIASTOLIC BLOOD PRESSURE: 101 MMHG | HEIGHT: 70 IN | HEART RATE: 79 BPM | WEIGHT: 229 LBS

## 2021-11-11 DIAGNOSIS — F41.0 GENERALIZED ANXIETY DISORDER WITH PANIC ATTACKS: ICD-10-CM

## 2021-11-11 DIAGNOSIS — Z79.899 MEDICATION MANAGEMENT: Primary | ICD-10-CM

## 2021-11-11 DIAGNOSIS — F32.1 CURRENT MODERATE EPISODE OF MAJOR DEPRESSIVE DISORDER, UNSPECIFIED WHETHER RECURRENT (HCC): ICD-10-CM

## 2021-11-11 DIAGNOSIS — F39 MOOD DISORDER (HCC): ICD-10-CM

## 2021-11-11 DIAGNOSIS — F41.1 GENERALIZED ANXIETY DISORDER WITH PANIC ATTACKS: ICD-10-CM

## 2021-11-11 DIAGNOSIS — F43.9 TRAUMA AND STRESSOR-RELATED DISORDER: ICD-10-CM

## 2021-11-11 LAB
AMPHETAMINE CUT-OFF: NORMAL
BENZODIAZIPINE CUT-OFF: NORMAL
BUPRENORPHINE CUT-OFF: NORMAL
COCAINE CUT-OFF: NORMAL
EXTERNAL AMPHETAMINE SCREEN URINE: NEGATIVE
EXTERNAL BENZODIAZEPINE SCREEN URINE: NEGATIVE
EXTERNAL BUPRENORPHINE SCREEN URINE: NEGATIVE
EXTERNAL COCAINE SCREEN URINE: NEGATIVE
EXTERNAL MDMA: NEGATIVE
EXTERNAL METHADONE SCREEN URINE: NEGATIVE
EXTERNAL METHAMPHETAMINE SCREEN URINE: NEGATIVE
EXTERNAL OPIATES SCREEN URINE: NEGATIVE
EXTERNAL OXYCODONE SCREEN URINE: NEGATIVE
EXTERNAL THC SCREEN URINE: NEGATIVE
MDMA CUT-OFF: NORMAL
METHADONE CUT-OFF: NORMAL
METHAMPHETAMINE CUT-OFF: NORMAL
OPIATES CUT-OFF: NORMAL
OXYCODONE CUT-OFF: NORMAL
THC CUT-OFF: NORMAL

## 2021-11-11 PROCEDURE — 99214 OFFICE O/P EST MOD 30 MIN: CPT | Performed by: NURSE PRACTITIONER

## 2021-11-11 RX ORDER — TRAZODONE HYDROCHLORIDE 100 MG/1
100 TABLET ORAL NIGHTLY
Qty: 30 TABLET | Refills: 0 | Status: SHIPPED | OUTPATIENT
Start: 2021-11-11

## 2021-11-11 NOTE — PROGRESS NOTES
"Subjective   Ajay Moore is a 41 y.o. male who presents today for follow up    Chief Complaint:  Anxiety and depression    History of Present Illness:   Here for a follow-up visit.  He is taking his medication as prescribed, denies side effects. He states his son is \"giving him a rough way to go\", states he is very defiant. Depression rated 2/10, anxiety 3/10, with 10 being the worst. Sleeping is better, harder to \"get up and get going\". Denies NM. Appetite is good.    Denies SI/HI/AVH.  Denies thoughts of self-harm.  Chronic health issues, no acute physical or medical issues today.                     The following portions of the patient's history were reviewed and updated as appropriate: allergies, current medications, past family history, past medical history, past social history, past surgical history and problem list.      Past Medical History:  Past Medical History:   Diagnosis Date   • Allergic rhinitis    • Arthritis    • Back pain    • Disorder of emotion (Anger)    • Low back pain    • Panic disorder        Social History:  Social History     Socioeconomic History   • Marital status:    Tobacco Use   • Smoking status: Current Every Day Smoker     Packs/day: 1.00   • Smokeless tobacco: Never Used   Vaping Use   • Vaping Use: Never used   Substance and Sexual Activity   • Alcohol use: No   • Drug use: No   • Sexual activity: Yes     Partners: Female     Birth control/protection: Surgical       Family History:  Family History   Problem Relation Age of Onset   • Diabetes Mother        Past Surgical History:  Past Surgical History:   Procedure Laterality Date   • CHOLECYSTECTOMY     • TONSILLECTOMY         Problem List:  Patient Active Problem List   Diagnosis   • Current smoker   • Allergic rhinitis   • History of multiple allergies   • Hypertensive disorder   • Chronic low back pain   • Essential hypertension       Allergy:   No Known Allergies     Current Medications:   Current Outpatient " "Medications   Medication Sig Dispense Refill   • cetirizine (zyrTEC) 10 MG tablet Take 1 tablet by mouth Daily.     • gabapentin (Neurontin) 800 MG tablet Take 1 tablet by mouth Every 8 (Eight) Hours.     • HYDROcodone-acetaminophen (NORCO)  MG per tablet Take  by mouth.     • methocarbamol (ROBAXIN) 750 MG tablet Take 1 tablet by mouth Every 12 (Twelve) Hours.     • montelukast (Singulair) 10 MG tablet Take 1 tablet by mouth Every Evening.     • naproxen (NAPROSYN) 500 MG tablet Take  by mouth Every 12 (Twelve) Hours.     • sertraline (Zoloft) 50 MG tablet Take 1.5 tablets by mouth Daily. 45 tablet 0   • traZODone (DESYREL) 100 MG tablet Take 1 tablet by mouth Every Night. 30 tablet 0     No current facility-administered medications for this visit.       Review of Symptoms:    Review of Systems   Constitutional: Negative.    HENT: Negative.    Eyes: Negative.    Respiratory: Negative.    Cardiovascular: Negative.    Skin: Negative.    Neurological: Negative.    Psychiatric/Behavioral: Positive for depressed mood. The patient is nervous/anxious.        Objective   Physical Exam:   Blood pressure (!) 158/101, pulse 79, height 176.6 cm (69.53\"), weight 104 kg (229 lb).  Body mass index is 33.31 kg/m².    Appearance:  male appears stated age, no acute distress noted.    Gait, Station, Strength: Steady, posture erect, WNL      Mental Status Exam:   Hygiene:   good  Cooperation:  Cooperative  Eye Contact:  Good  Psychomotor Behavior:  Appropriate  Affect:  Appropriate  Mood: normal  Hopelessness: Denies  Speech:  Normal  Thought Process:  Goal directed and Linear  Thought Content:  Normal  Suicidal:  None  Homicidal:  None  Hallucinations:  None  Delusion:  None  Memory:  Intact  Orientation:  Person, Place, Time and Situation  Reliability:  fair  Insight:  Fair  Judgement:  Fair  Impulse Control:  Fair  Physical/Medical Issues:  Yes Joint pain, back pain     PHQ-9 Total Score : 3    Lab Results:   Office " Visit on 11/11/2021   Component Date Value Ref Range Status   • External Amphetamine Screen Urine 11/11/2021 Negative   Final   • Amphetamine Cut-Off 11/11/2021 1000NG/ML   Final   • External Benzodiazepine Screen Uri* 11/11/2021 Negative   Final   • Benzodiazipine Cut-Off 11/11/2021 300NG/ML   Final   • External Cocaine Screen Urine 11/11/2021 Negative   Final   • Cocaine Cut-Off 11/11/2021 300N/GML   Final   • External THC Screen Urine 11/11/2021 Negative   Final   • THC Cut-Off 11/11/2021 50NG/ML   Final   • External Methadone Screen Urine 11/11/2021 Negative   Final   • Methadone Cut-Off 11/11/2021 300NG/ML   Final   • External Methamphetamine Screen Ur* 11/11/2021 Negative   Final   • Methamphetamine Cut-Off 11/11/2021 1000NG/ML   Final   • External Oxycodone Screen Urine 11/11/2021 Negative   Final   • Oxycodone Cut-Off 11/11/2021 100NG/ML   Final   • External Buprenorphine Screen Urine 11/11/2021 Negative   Final   • Buprenorphine Cut-Off 11/11/2021 10NG/ML   Final   • External MDMA 11/11/2021 Negative   Final   • MDMA Cut-Off 11/11/2021 500NG/ML   Final   • External Opiates Screen Urine 11/11/2021 Negative   Final   • Opiates Cut-Off 11/11/2021 300NG/ML   Final       Assessment/Plan   Problems Addressed this Visit     None      Visit Diagnoses     Medication management    -  Primary    Relevant Medications    sertraline (Zoloft) 50 MG tablet    traZODone (DESYREL) 100 MG tablet    Other Relevant Orders    [facility] Drug Screen (Completed)    Generalized anxiety disorder with panic attacks        Relevant Medications    sertraline (Zoloft) 50 MG tablet    traZODone (DESYREL) 100 MG tablet    Trauma and stressor-related disorder        Relevant Medications    sertraline (Zoloft) 50 MG tablet    traZODone (DESYREL) 100 MG tablet    Current moderate episode of major depressive disorder, unspecified whether recurrent (HCC)        Relevant Medications    sertraline (Zoloft) 50 MG tablet    traZODone (DESYREL) 100 MG  tablet    Mood disorder (CMS/HCC) R/O: PTSD        Relevant Medications    sertraline (Zoloft) 50 MG tablet    traZODone (DESYREL) 100 MG tablet      Diagnoses       Codes Comments    Medication management    -  Primary ICD-10-CM: Z79.899  ICD-9-CM: V58.69     Generalized anxiety disorder with panic attacks     ICD-10-CM: F41.1, F41.0  ICD-9-CM: 300.02, 300.01     Trauma and stressor-related disorder     ICD-10-CM: F43.9  ICD-9-CM: 309.81, 308.9     Current moderate episode of major depressive disorder, unspecified whether recurrent (HCC)     ICD-10-CM: F32.1  ICD-9-CM: 296.22     Mood disorder (CMS/HCC) R/O: PTSD     ICD-10-CM: F39  ICD-9-CM: 296.90           Social History     Tobacco Use   Smoking Status Current Every Day Smoker   • Packs/day: 1.00   Smokeless Tobacco Never Used     YOSVANY reviewed and appropriate. Patient counseled on use of controlled substances.     -The benefits of a healthy diet and exercise were discussed with patient, especially the positive effects they have on mental health. Patient encouraged to consider lifestyle modification regarding  diet and exercise patterns to maximize results of mental health treatment.  -Reviewed previous available documentation  -Reviewed most recent available labs   -I've explained to him that drugs of the SSRI class can have side effects such as weight gain, sexual dysfunction, insomnia, headache, nausea. These medications are generally effective at alleviating symptoms of anxiety and/or depression. Let me know if significant side effects do occur.          Visit Diagnoses:    ICD-10-CM ICD-9-CM   1. Medication management  Z79.899 V58.69   2. Generalized anxiety disorder with panic attacks  F41.1 300.02    F41.0 300.01   3. Trauma and stressor-related disorder  F43.9 309.81     308.9   4. Current moderate episode of major depressive disorder, unspecified whether recurrent (HCC)  F32.1 296.22   5. Mood disorder (CMS/HCC) R/O: PTSD  F39 296.90          TREATMENT PLAN/GOALS: Continue supportive psychotherapy efforts and medications as indicated. Treatment and medication options discussed during today's visit. Patient acknowledged and verbally consented to continue with current treatment plan and was educated on the importance of compliance with treatment and follow-up appointments.    MEDICATION ISSUES:  Discussed medication options and treatment plan of prescribed medication as well as the risks, benefits, and side effects including potential falls, possible impaired driving and metabolic adversities among others. Patient is agreeable to call the office with any worsening of symptoms or onset of side effects. Patient is agreeable to call 911 or go to the nearest ER should he/she begin having SI/HI.     MEDS ORDERED DURING VISIT:  New Medications Ordered This Visit   Medications   • sertraline (Zoloft) 50 MG tablet     Sig: Take 1.5 tablets by mouth Daily.     Dispense:  45 tablet     Refill:  0   • traZODone (DESYREL) 100 MG tablet     Sig: Take 1 tablet by mouth Every Night.     Dispense:  30 tablet     Refill:  0       Return in about 1 month (around 12/11/2021) for Recheck.  -Continue psychotherapy.  -Increase  Zoloft 50 mg tablet take 1.5 tablets  by mouth daily for anxiety and depression.  -Continue trazodone 100 mg tablet take 1 tablet by mouth every night for insomnia, anxiety and depression.           Prognosis: Guarded dependent on medication/follow up and treatment plan compliance.  Functionality: pt showing improvements in important areas of daily functioning.     Short-term goals: Patient will adhere to medication regimen and note continued improvement in symptoms over the next 3 months.   Long-term goals: Patient will be adherent to medication management and psychotherapy with continued improvement in symptoms over the next 6 months      I spent 30 minutes caring for Ajay on this date of service. This time includes time spent by me in the  following activities: preparing for the visit, obtaining and/or reviewing a separately obtained history, performing a medically appropriate examination and/or evaluation, counseling and educating the patient/family/caregiver, ordering medications, tests, or procedures and documenting information in the medical record          This document has been electronically signed by IWONA Blum   November 11, 2021 11:23 EST    Part of this note may be an electronic transcription/translation of spoken language to printed text using the Dragon Dictation System.

## 2021-12-28 ENCOUNTER — OFFICE VISIT (OUTPATIENT)
Dept: PSYCHIATRY | Facility: CLINIC | Age: 41
End: 2021-12-28

## 2021-12-28 DIAGNOSIS — F41.0 GENERALIZED ANXIETY DISORDER WITH PANIC ATTACKS: Primary | ICD-10-CM

## 2021-12-28 DIAGNOSIS — F43.9 TRAUMA AND STRESSOR-RELATED DISORDER: ICD-10-CM

## 2021-12-28 DIAGNOSIS — G89.29 OTHER CHRONIC PAIN: ICD-10-CM

## 2021-12-28 DIAGNOSIS — F41.1 GENERALIZED ANXIETY DISORDER WITH PANIC ATTACKS: Primary | ICD-10-CM

## 2021-12-28 DIAGNOSIS — R46.89 AGGRESSIVE BEHAVIOR: ICD-10-CM

## 2021-12-28 DIAGNOSIS — R45.4 DIFFICULTY CONTROLLING ANGER: ICD-10-CM
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
## 2021-12-28 DIAGNOSIS — F39 MOOD DISORDER: ICD-10-CM

## 2021-12-28 PROCEDURE — 90837 PSYTX W PT 60 MINUTES: CPT | Performed by: COUNSELOR

## 2022-01-25 ENCOUNTER — OFFICE VISIT (OUTPATIENT)
Dept: PSYCHIATRY | Facility: CLINIC | Age: 42
End: 2022-01-25

## 2022-01-25 DIAGNOSIS — R46.89 AGGRESSIVE BEHAVIOR: ICD-10-CM
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
## 2022-01-25 DIAGNOSIS — F41.0 GENERALIZED ANXIETY DISORDER WITH PANIC ATTACKS: Primary | ICD-10-CM

## 2022-01-25 DIAGNOSIS — R45.4 DIFFICULTY CONTROLLING ANGER: ICD-10-CM

## 2022-01-25 DIAGNOSIS — G47.9 SLEEP DIFFICULTIES: ICD-10-CM

## 2022-01-25 DIAGNOSIS — F41.1 GENERALIZED ANXIETY DISORDER WITH PANIC ATTACKS: Primary | ICD-10-CM

## 2022-01-25 DIAGNOSIS — G89.29 OTHER CHRONIC PAIN: ICD-10-CM

## 2022-01-25 DIAGNOSIS — F17.200 CURRENT SMOKER: ICD-10-CM

## 2022-01-25 DIAGNOSIS — F43.9 TRAUMA AND STRESSOR-RELATED DISORDER: ICD-10-CM

## 2022-01-25 DIAGNOSIS — F39 MOOD DISORDER: ICD-10-CM

## 2022-01-25 PROCEDURE — 90837 PSYTX W PT 60 MINUTES: CPT | Performed by: COUNSELOR
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
## 2022-02-07 ENCOUNTER — DOCUMENTATION (OUTPATIENT)
Dept: PSYCHIATRY | Facility: CLINIC | Age: 42
End: 2022-02-07

## 2022-02-07 NOTE — PROGRESS NOTES
Therapist consulted with Ryne Medina HILIANET, per his request.  Mr. Eden notes MrEdith Toney has a court review on 02/23/21.  Mr. Eden requests a status update.  Please refer to letter sent to HILIANET dated today 02/07/22.      This document electronically signed by LASHELL Gaitan-S, [facility] February 7, 2022 15:14 EST

## 2025-07-16 ENCOUNTER — TRANSCRIBE ORDERS (OUTPATIENT)
Dept: ADMINISTRATIVE | Facility: HOSPITAL | Age: 45
End: 2025-07-16
Payer: COMMERCIAL

## 2025-07-16 DIAGNOSIS — J34.2 DEVIATED NASAL SEPTUM: Primary | ICD-10-CM

## 2025-07-18 ENCOUNTER — HOSPITAL ENCOUNTER (OUTPATIENT)
Facility: HOSPITAL | Age: 45
Discharge: HOME | End: 2025-07-18
Payer: COMMERCIAL

## 2025-07-18 DIAGNOSIS — J34.2 DEVIATED NASAL SEPTUM: ICD-10-CM

## 2025-07-18 PROCEDURE — 70486 CT MAXILLOFACIAL W/O DYE: CPT | Performed by: RADIOLOGY

## 2025-07-18 PROCEDURE — 70486 CT MAXILLOFACIAL W/O DYE: CPT
